# Patient Record
Sex: MALE | Race: BLACK OR AFRICAN AMERICAN | NOT HISPANIC OR LATINO
[De-identification: names, ages, dates, MRNs, and addresses within clinical notes are randomized per-mention and may not be internally consistent; named-entity substitution may affect disease eponyms.]

---

## 2020-02-19 ENCOUNTER — APPOINTMENT (OUTPATIENT)
Dept: ORTHOPEDIC SURGERY | Facility: CLINIC | Age: 50
End: 2020-02-19
Payer: COMMERCIAL

## 2020-02-19 DIAGNOSIS — M17.12 UNILATERAL PRIMARY OSTEOARTHRITIS, LEFT KNEE: ICD-10-CM

## 2020-02-19 DIAGNOSIS — M17.11 UNILATERAL PRIMARY OSTEOARTHRITIS, RIGHT KNEE: ICD-10-CM

## 2020-02-19 DIAGNOSIS — S83.512A SPRAIN OF ANTERIOR CRUCIATE LIGAMENT OF LEFT KNEE, INITIAL ENCOUNTER: ICD-10-CM

## 2020-02-19 PROCEDURE — 73564 X-RAY EXAM KNEE 4 OR MORE: CPT | Mod: LT

## 2020-02-19 PROCEDURE — 99204 OFFICE O/P NEW MOD 45 MIN: CPT

## 2020-02-19 NOTE — DISCUSSION/SUMMARY
[de-identified] : 49 y/o male present with chronic intermittent left knee pain.  The pain is negatively impacting the patient's quality of life, and limiting normal daily activities. The patient is a candidate for surgery based on imaging and quality of life. i reviewed  unicompartmental replacement.will consider advise and get back to office

## 2020-02-19 NOTE — CONSULT LETTER
[Dear  ___] : Dear  [unfilled], [Consult Letter:] : I had the pleasure of evaluating your patient, [unfilled]. [Please see my note below.] : Please see my note below. [Sincerely,] : Sincerely, [FreeTextEntry3] : Dr. Nelson

## 2020-02-19 NOTE — PHYSICAL EXAM
[de-identified] : Well-nourished, in no acute distress\par Alert and oriented to time, place and person\par Skin: no lesions discoloration\par Respirations: unlabored\par Cardiac: no leg swelling\par Lymphatic: no groin adenopathy \par Left knee: Dorsal Pedal Pulse 1+, effusion 2+, flexion 120,  ligaments intact, tender medial joint line.\par Right knee: flexion 0/ 130 [de-identified] : AP, notch standing, lateral and sunrise Xrays of the left knee taken in the office today demonstrates medial compartment degenerative narrowing osteophytes and subchondral sclerosis

## 2020-02-19 NOTE — ADDENDUM
[FreeTextEntry1] : I, Russ Medina, acted solely as a scribe for Dr. Steve Nelson on 02/19/2020  .\par  \par All medical record entries made by the scribe were at my, Dr. Steve Nelson, direction and personally dictated by me on 02/19/2020. I have reviewed the chart and agree that the record accurately reflects my personal performance of the history, physical exam, assessment and plan. I have also personally directed, reviewed, and agreed with the chart.

## 2020-02-19 NOTE — HISTORY OF PRESENT ILLNESS
[de-identified] : 49 y/o male [employer] present with chronic intermittent left knee pain. Associated with limited ROM and swelling. Pain in medial and lateral aspects of knee. Provoked by weight bearing. Pain is constant, aggravated by walking, relief with rest. Pt denies locking or giving out. s/p left knee arthroscopy 8 years ago. No relief from steroid and Synvisc injections.

## 2020-03-02 ENCOUNTER — APPOINTMENT (OUTPATIENT)
Dept: MRI IMAGING | Facility: CLINIC | Age: 50
End: 2020-03-02
Payer: COMMERCIAL

## 2020-03-02 ENCOUNTER — OUTPATIENT (OUTPATIENT)
Dept: OUTPATIENT SERVICES | Facility: HOSPITAL | Age: 50
LOS: 1 days | End: 2020-03-02
Payer: COMMERCIAL

## 2020-03-02 DIAGNOSIS — Z00.8 ENCOUNTER FOR OTHER GENERAL EXAMINATION: ICD-10-CM

## 2020-03-02 PROCEDURE — 73721 MRI JNT OF LWR EXTRE W/O DYE: CPT | Mod: 26,LT

## 2020-03-02 PROCEDURE — 73721 MRI JNT OF LWR EXTRE W/O DYE: CPT
